# Patient Record
Sex: FEMALE | Race: WHITE | NOT HISPANIC OR LATINO | URBAN - METROPOLITAN AREA
[De-identification: names, ages, dates, MRNs, and addresses within clinical notes are randomized per-mention and may not be internally consistent; named-entity substitution may affect disease eponyms.]

---

## 2024-12-17 ENCOUNTER — OFFICE VISIT (OUTPATIENT)
Dept: URGENT CARE | Age: 39
End: 2024-12-17
Payer: MEDICAID

## 2024-12-17 VITALS
BODY MASS INDEX: 29.62 KG/M2 | DIASTOLIC BLOOD PRESSURE: 72 MMHG | RESPIRATION RATE: 16 BRPM | WEIGHT: 200 LBS | TEMPERATURE: 97.4 F | OXYGEN SATURATION: 98 % | HEIGHT: 69 IN | HEART RATE: 102 BPM | SYSTOLIC BLOOD PRESSURE: 107 MMHG

## 2024-12-17 DIAGNOSIS — J06.9 VIRAL URI: Primary | ICD-10-CM

## 2024-12-17 DIAGNOSIS — Z20.822 CONTACT WITH AND (SUSPECTED) EXPOSURE TO COVID-19: ICD-10-CM

## 2024-12-17 DIAGNOSIS — R68.89 FLU-LIKE SYMPTOMS: ICD-10-CM

## 2024-12-17 DIAGNOSIS — J02.9 SORE THROAT: ICD-10-CM

## 2024-12-17 LAB
POC RAPID INFLUENZA A: NEGATIVE
POC RAPID INFLUENZA B: NEGATIVE
POC RAPID STREP: NEGATIVE
POC SARS-COV-2 AG BINAX: NORMAL

## 2024-12-17 PROCEDURE — 87804 INFLUENZA ASSAY W/OPTIC: CPT | Performed by: NURSE PRACTITIONER

## 2024-12-17 PROCEDURE — 87811 SARS-COV-2 COVID19 W/OPTIC: CPT | Performed by: NURSE PRACTITIONER

## 2024-12-17 PROCEDURE — 3008F BODY MASS INDEX DOCD: CPT | Performed by: NURSE PRACTITIONER

## 2024-12-17 PROCEDURE — 99204 OFFICE O/P NEW MOD 45 MIN: CPT | Performed by: NURSE PRACTITIONER

## 2024-12-17 PROCEDURE — 1036F TOBACCO NON-USER: CPT | Performed by: NURSE PRACTITIONER

## 2024-12-17 PROCEDURE — 87880 STREP A ASSAY W/OPTIC: CPT | Performed by: NURSE PRACTITIONER

## 2024-12-17 RX ORDER — PANTOPRAZOLE SODIUM 40 MG/1
1 TABLET, DELAYED RELEASE ORAL
COMMUNITY
Start: 2024-05-26

## 2024-12-17 RX ORDER — LANOLIN ALCOHOL/MO/W.PET/CERES
1 CREAM (GRAM) TOPICAL DAILY
COMMUNITY

## 2024-12-17 RX ORDER — CLOPIDOGREL BISULFATE 75 MG/1
75 TABLET ORAL
COMMUNITY
Start: 2024-12-16

## 2024-12-17 RX ORDER — BROMPHENIRAMINE MALEATE, PSEUDOEPHEDRINE HYDROCHLORIDE, AND DEXTROMETHORPHAN HYDROBROMIDE 2; 30; 10 MG/5ML; MG/5ML; MG/5ML
SYRUP ORAL
Qty: 200 ML | Refills: 0 | Status: SHIPPED | OUTPATIENT
Start: 2024-12-17

## 2024-12-17 RX ORDER — PREDNISONE 20 MG/1
20 TABLET ORAL DAILY
Qty: 5 TABLET | Refills: 0 | Status: SHIPPED | OUTPATIENT
Start: 2024-12-17 | End: 2024-12-22

## 2024-12-17 RX ORDER — ASPIRIN 81 MG/1
81 TABLET ORAL DAILY
COMMUNITY

## 2024-12-17 RX ORDER — MELOXICAM 15 MG/1
15 TABLET ORAL DAILY
COMMUNITY

## 2024-12-17 RX ORDER — TRAMADOL HYDROCHLORIDE 50 MG/1
TABLET ORAL
COMMUNITY

## 2024-12-17 RX ORDER — LEVOTHYROXINE SODIUM 175 UG/1
TABLET ORAL
COMMUNITY
Start: 2024-12-08

## 2024-12-17 RX ORDER — EPINEPHRINE 0.22MG
200 AEROSOL WITH ADAPTER (ML) INHALATION DAILY
COMMUNITY

## 2024-12-17 RX ORDER — GABAPENTIN 300 MG/1
300 CAPSULE ORAL 3 TIMES DAILY
COMMUNITY

## 2024-12-17 ASSESSMENT — ENCOUNTER SYMPTOMS
PSYCHIATRIC NEGATIVE: 1
RESPIRATORY NEGATIVE: 1
ENDOCRINE NEGATIVE: 1
SORE THROAT: 1
MUSCULOSKELETAL NEGATIVE: 1
NEUROLOGICAL NEGATIVE: 1
FEVER: 1
CHILLS: 1
HEMATOLOGIC/LYMPHATIC NEGATIVE: 1
GASTROINTESTINAL NEGATIVE: 1
ALLERGIC/IMMUNOLOGIC NEGATIVE: 1
CARDIOVASCULAR NEGATIVE: 1
EYES NEGATIVE: 1
RHINORRHEA: 1

## 2024-12-18 NOTE — PROGRESS NOTES
"Subjective   Patient ID: Elvira Angel is a 38 y.o. female. They present today with a chief complaint of Fever, Earache, Chills, and Sore Throat.    History of Present Illness  Patient is a 39 y/o female c/o nasal congestion/drainage, fever, ear pain, bodyaches, fever, chills, sore throat x3days. Patient denies symptoms of lethargy, weakness, chest pain/tightness/pressure, SOB, wheezing, N/V/D, ABD pain. No OTC medication reported to be taken.       Fever   Associated symptoms include congestion, ear pain and a sore throat.   Earache   Associated symptoms include rhinorrhea and a sore throat.   Sore Throat   Associated symptoms include congestion and ear pain.       Past Medical History  Allergies as of 12/17/2024 - Reviewed 12/17/2024   Allergen Reaction Noted    Amoxicillin Nausea/vomiting 12/19/2011    Atorvastatin Myalgia 01/19/2024    Latex, natural rubber Rash 12/19/2011       (Not in a hospital admission)       History reviewed. No pertinent past medical history.    History reviewed. No pertinent surgical history.     reports that she has never smoked. She has never used smokeless tobacco. She reports that she does not drink alcohol and does not use drugs.    Review of Systems  Review of Systems   Constitutional:  Positive for chills and fever.        Bodyaches   HENT:  Positive for congestion, ear pain, rhinorrhea and sore throat.    Eyes: Negative.    Respiratory: Negative.     Cardiovascular: Negative.    Gastrointestinal: Negative.    Endocrine: Negative.    Genitourinary: Negative.    Musculoskeletal: Negative.    Skin: Negative.    Allergic/Immunologic: Negative.    Neurological: Negative.    Hematological: Negative.    Psychiatric/Behavioral: Negative.                                    Objective    Vitals:    12/17/24 1926   BP: 107/72   Pulse: 102   Resp: 16   Temp: 36.3 °C (97.4 °F)   SpO2: 98%   Weight: 90.7 kg (200 lb)   Height: 1.753 m (5' 9\")     No LMP recorded.    Physical " Exam  Constitutional:       Comments: Patient A/O x4, LOC 5, calm and cooperative. Patient self-ambulatory to treatment area and is in no acute distress.    HENT:      Head: Normocephalic and atraumatic.      Ears:      Comments: Bilateral TMs pearly grey with moderate amounts of serous fluid behind TMs; nonbulging and nonperforated TMs bilaterally. No edema, erythema, exudates, cerumen to canals bilaterally.      Nose:      Comments: No edema, erythema to bilateral inferior turbinates. Trace amounts of serous drainage from nares. No frontal or maxillary sinus pressure to palpation      Mouth/Throat:      Comments: Posterior pharynx erythematous without tonsillar enlargement or exudates. Uvula midline. No petechiae to palates. Tonsil stone to left side.   Eyes:      Extraocular Movements: Extraocular movements intact.      Conjunctiva/sclera: Conjunctivae normal.      Pupils: Pupils are equal, round, and reactive to light.   Cardiovascular:      Rate and Rhythm: Normal rate and regular rhythm.      Pulses: Normal pulses.      Heart sounds: Normal heart sounds.   Pulmonary:      Comments: No audible cough during physical examination. All lungfields clear to roomair per auscultation. Patient speaking in complete sentences without SOB or difficulty. Patient in no acute respiratory distress   Abdominal:      General: Abdomen is flat.      Palpations: Abdomen is soft.   Musculoskeletal:         General: Normal range of motion.      Cervical back: Neck supple.   Skin:     General: Skin is warm and dry.      Capillary Refill: Capillary refill takes less than 2 seconds.   Neurological:      General: No focal deficit present.      Mental Status: She is oriented to person, place, and time.   Psychiatric:         Mood and Affect: Mood normal.         Behavior: Behavior normal.         Procedures    Point of Care Test & Imaging Results from this visit  Results for orders placed or performed in visit on 12/17/24   POCT rapid  strep A manually resulted   Result Value Ref Range    POC Rapid Strep Negative Negative   POCT Covid-19 Rapid Antigen   Result Value Ref Range    POC DEANNA-COV-2 AG  Presumptive negative test for SARS-CoV-2 (no antigen detected)     Presumptive negative test for SARS-CoV-2 (no antigen detected)   POCT Influenza A/B manually resulted   Result Value Ref Range    POC Rapid Influenza A Negative Negative    POC Rapid Influenza B Negative Negative      No results found.    Diagnostic study results (if any) were reviewed by HARJINDER Brice.    Assessment/Plan   Allergies, medications, history, and pertinent labs/EKGs/Imaging reviewed by HARJINDER Brice.     Medical Decision Making  Symptoms likely viral in nature at this time. Will send Rx of Prednisone and Bromfed for symptom relief. OTC Motrin/Tylenol as needed.     At time of discharge, patient was clinically well-appearing and appropriate for outpatient management. The patient/parent/guardian was educated regarding diagnosis, supportive care, OTC and Rx medications. The patient/parent/guardian was given the opportunity to ask questions prior to discharge. They verbalized understanding of discussion of treatment plan, expected course of illness and/or injury, indications on when to return to , when to seek further evaluation in ED/call 911, and the need to follow up with PCP and/or specialist as referred. Patient/parent/guardian was provided with work/school documentation if requested. Patient stable upon discharge.     Orders and Diagnoses  Diagnoses and all orders for this visit:  Viral URI  -     predniSONE (Deltasone) 20 mg tablet; Take 1 tablet (20 mg) by mouth once daily for 5 days. Take in the morning with food  -     brompheniramine-pseudoeph-DM 2-30-10 mg/5 mL syrup; Take 5-10mL PO every 6-8 hours as needed for cough. May cause drowsiness  Sore throat  -     POCT rapid strep A manually resulted  Contact with and (suspected) exposure to covid-19  -      POCT Covid-19 Rapid Antigen  Flu-like symptoms  -     POCT Influenza A/B manually resulted      Medical Admin Record      Patient disposition: Home    Electronically signed by HARJINDER Brice  7:49 PM

## 2025-01-27 ENCOUNTER — APPOINTMENT (OUTPATIENT)
Dept: SURGERY | Facility: CLINIC | Age: 40
End: 2025-01-27

## 2025-01-27 VITALS
OXYGEN SATURATION: 99 % | SYSTOLIC BLOOD PRESSURE: 110 MMHG | WEIGHT: 200 LBS | TEMPERATURE: 97.3 F | DIASTOLIC BLOOD PRESSURE: 78 MMHG | BODY MASS INDEX: 29.53 KG/M2 | HEART RATE: 89 BPM

## 2025-01-27 DIAGNOSIS — K60.2 ANAL FISSURE: Primary | ICD-10-CM

## 2025-01-27 PROCEDURE — 99203 OFFICE O/P NEW LOW 30 MIN: CPT | Performed by: SURGERY

## 2025-01-27 NOTE — PROGRESS NOTES
Subjective   Patient ID: Elvira Angel is a 39 y.o. female who presents for New Patient Visit (hemorrhoids).  She had a severe rectal pain with every bowel movement.  The patient previously was diagnosed with anal fissure.  Treated with nitroglycerin ointment without success.  Referred to my attention for further treatment and recommendations.  Patient has severe pain, up to the point when she is crying during the office visit    HPI as described above.  The patient had a history of cardiac surgery with PFO closure, currently patient is on the Plavix.  Review of Systems GI consistent with severe rectal pain.  Review of all other 14 system is negative  Physical Exam pulse equal bilaterally, mucosa moist, bilateral breath sounds, noted to auscultation, regular rate, no murmurs, Karla soft, nontender, palpable peripheral pulse, no focal neurological motor deficits.  Rectal exam was done in the presence of the female chaperone.  There are enlarged external hemorrhoids without evidence of thrombosis.  Extremely painful digital exam, which precludes adequate assessment.    Objective I reviewed all available data including lab results, radiological studies, previous reports and notes.    No diagnosis found.   There is no problem list on file for this patient.     Allergies   Allergen Reactions    Amoxicillin Nausea/vomiting    Atorvastatin Myalgia    Latex, Natural Rubber Rash      Medication Documentation Review Audit       Reviewed by Benny Chinchilla MD (Physician) on 01/27/25 at 1042      Medication Order Taking? Sig Documenting Provider Last Dose Status   aspirin 81 mg EC tablet [id number]  Take 81 mg by mouth once daily. Historical Provider, MD  Active   brompheniramine-pseudoeph-DM 2-30-10 mg/5 mL syrup [id number]  Take 5-10mL PO every 6-8 hours as needed for cough. May cause drowsiness Tia Mandela, APRN-CNP  Active   clopidogrel (Plavix) 75 mg tablet [id number]  Take 75 mg by mouth once daily. Historical Provider,  MD  Active   coenzyme Q-10 100 mg capsule [id number]  Take 200 mg by mouth once daily. Historical Provider, MD  Active   gabapentin (Neurontin) 300 mg capsule [id number]  Take 300 mg by mouth 3 times a day. Historical Provider, MD  Active   magnesium oxide (Mag-Ox) 400 mg (241.3 mg magnesium) tablet [id number]  Take 1 tablet by mouth once daily. Historical Provider, MD  Active   meloxicam (Mobic) 15 mg tablet [id number]  Take 15 mg by mouth once daily. Historical Provider, MD  Active   pantoprazole (ProtoNix) 40 mg EC tablet [id number]  Take 1 tablet by mouth every 12 hours. Historical Provider, MD  Active   Synthroid 175 mcg tablet [id number]   Historical Provider, MD  Active   traMADol (Ultram) 50 mg tablet [id number]  TAKE 1 TO 2 TABLETS BY MOUTH EVERY DAY AS NEEDED Historical Provider, MD  Active                    History reviewed. No pertinent past medical history.  Social History     Tobacco Use   Smoking Status Never   Smokeless Tobacco Current   Tobacco Comments    vape     No family history on file.   History reviewed. No pertinent surgical history.    Assessment/Plan   With severe rectal pain, previously diagnosed with a fissure.  Failure of nonoperative management.  The patient has indication for exam anesthesia, possible rectal dilation to promote fissure healing, possible fissurectomy.  Currently patient is on the Plavix secondary to cardiac surgery, therefore patient will continue to take Plavix with understanding that it may change plan of the fissurectomy.  Risks, benefits, alternative treatment were explained to the patient.  All questions were answered.  Informed consent was obtained.      Benny Chinchilla MD

## 2025-01-31 LAB — NON-UH HIE POC GLUCOSE: 96 MG/DL (ref 72–100)

## 2025-02-05 DIAGNOSIS — K60.2 ANAL FISSURE: Primary | ICD-10-CM

## 2025-02-06 RX ORDER — KETOROLAC TROMETHAMINE 10 MG/1
10 TABLET, FILM COATED ORAL EVERY 6 HOURS PRN
Qty: 20 TABLET | Refills: 0 | Status: SHIPPED | OUTPATIENT
Start: 2025-02-06

## 2025-02-19 ENCOUNTER — APPOINTMENT (OUTPATIENT)
Dept: SURGERY | Facility: CLINIC | Age: 40
End: 2025-02-19
Payer: MEDICAID

## 2025-02-19 DIAGNOSIS — K60.2 ANAL FISSURE: Primary | ICD-10-CM

## 2025-02-19 PROCEDURE — 99024 POSTOP FOLLOW-UP VISIT: CPT | Performed by: SURGERY

## 2025-02-19 ASSESSMENT — PAIN SCALES - GENERAL: PAINLEVEL_OUTOF10: 8

## 2025-02-19 NOTE — PROGRESS NOTES
Subjective   Patient ID: Elvira Angel is a 39 y.o. female who presents for postoperative visit after fissurectomy    HPI of anal fissure  Review of Systems  Physical Exam  There is no evidence of infection.  Persistent external hemorrhoids without complications.  Area of fissurectomy is healing by the primary intention.  Vicryl sutures are still in the place  Objective     No diagnosis found.   There is no problem list on file for this patient.     Allergies   Allergen Reactions    Amoxicillin Nausea/vomiting    Atorvastatin Myalgia    Latex, Natural Rubber Rash      Medication Documentation Review Audit       Reviewed by Benny Chinchilla MD (Physician) on 02/19/25 at 1325      Medication Order Taking? Sig Documenting Provider Last Dose Status   aspirin 81 mg EC tablet [id number] Yes Take 1 tablet (81 mg) by mouth once daily. Historical Provider, MD  Active   brompheniramine-pseudoeph-DM 2-30-10 mg/5 mL syrup [id number] Yes Take 5-10mL PO every 6-8 hours as needed for cough. May cause drowsiness Tia Mandela, APRN-CNP  Active   clopidogrel (Plavix) 75 mg tablet [id number] Yes Take 1 tablet (75 mg) by mouth once daily. Historical Provider, MD  Active   coenzyme Q-10 100 mg capsule [id number] Yes Take 2 capsules (200 mg) by mouth once daily. Historical Provider, MD  Active   gabapentin (Neurontin) 300 mg capsule [id number] Yes Take 1 capsule (300 mg) by mouth 3 times a day. Historical Provider, MD  Active   ketorolac (Toradol) 10 mg tablet [id number] Yes Take 1 tablet (10 mg) by mouth every 6 hours if needed for moderate pain (4 - 6) for up to 30 doses. Benny Chinchilla MD  Active   magnesium oxide (Mag-Ox) 400 mg (241.3 mg magnesium) tablet [id number] Yes Take 1 tablet (400 mg) by mouth once daily. Historical Provider, MD  Active   meloxicam (Mobic) 15 mg tablet [id number] Yes Take 1 tablet (15 mg) by mouth once daily. Historical Provider, MD  Active   pantoprazole (ProtoNix) 40 mg EC tablet [id number] Yes Take 1 tablet  (40 mg) by mouth every 12 hours. Historical Provider, MD  Active   Synthroid 175 mcg tablet [id number] Yes  Historical Provider, MD  Active   traMADol (Ultram) 50 mg tablet [id number] Yes TAKE 1 TO 2 TABLETS BY MOUTH EVERY DAY AS NEEDED Historical Provider, MD  Active                    History reviewed. No pertinent past medical history.  Social History     Tobacco Use   Smoking Status Never   Smokeless Tobacco Current   Tobacco Comments    vape     No family history on file.   History reviewed. No pertinent surgical history.    Assessment/Plan   That was post fissurectomy.  Gradual improvement.  No further treatment is indicated.  Recommended to continue use the nitroglycerin ointment.  Follow-up as needed      Benny Chinchilla MD

## 2025-03-06 DIAGNOSIS — K60.2 ANAL FISSURE: Primary | ICD-10-CM

## 2025-03-06 NOTE — TELEPHONE ENCOUNTER
Pt had a procedue 3/05/25 x   Fussurectomy.  Pt requested a Rx for stool softener. She does not prefer getting over the counter, if she has insurance.

## 2025-03-07 RX ORDER — ASPIRIN 81 MG
100 TABLET, DELAYED RELEASE (ENTERIC COATED) ORAL 2 TIMES DAILY
Qty: 10 TABLET | Refills: 0 | Status: SHIPPED | OUTPATIENT
Start: 2025-03-07 | End: 2025-03-17

## 2025-03-12 ENCOUNTER — APPOINTMENT (OUTPATIENT)
Dept: SURGERY | Facility: CLINIC | Age: 40
End: 2025-03-12
Payer: MEDICAID

## 2025-03-12 DIAGNOSIS — K64.8 HEMORRHOIDS, COMPLICATED: Primary | ICD-10-CM

## 2025-03-12 PROCEDURE — 46600 DIAGNOSTIC ANOSCOPY SPX: CPT | Performed by: SURGERY

## 2025-03-12 PROCEDURE — 99214 OFFICE O/P EST MOD 30 MIN: CPT | Performed by: SURGERY

## 2025-03-12 ASSESSMENT — PAIN SCALES - GENERAL: PAINLEVEL_OUTOF10: 6

## 2025-03-12 NOTE — PROGRESS NOTES
Subjective   Patient ID: Elvira Angel is a 39 y.o. female who presents for Hemorrhoids.  Patient had a previous history of fissurectomy.  Patient was doing very well, patient develop inflammatory hemorrhoid, was seen in the emergency department was treated nonoperatively.  Referred to my attention for reassessment.    HPI history of anal fissure, fissurectomy.  Subsequent development of thrombosed hemorrhoid, which improved with nonoperative management  Review of Systems GI consistent with rectal pain.  Review of all other 10 system is negative  Physical Exam  Pupils equal bilaterally, mucosa moist, bilateral breath sounds, regular rate, abdomen soft, nontender, palpable peripheral pulse, no focal neurological motor deficits.    Rectal exam was done in the presence of female chaperonePatient ID: Elvira Angel is a 39 y.o. female.    Anoscopy    Date/Time: 3/12/2025 3:32 PM    Performed by: Benny Chinchilla MD  Authorized by: Benny Chinchilla MD    Consent:     Consent obtained:  Verbal    Consent given by:  Patient    Risks, benefits, and alternatives were discussed: yes    Anoscopy showed inflamed hemorrhoid posteriorly and to the left.  Patient has a multiple external hemorrhoids, nonreducible, multiple skin tags.  Significant improvement of the fissure with near complete healing  Objective I reviewed all available data including lab results, radiological studies, previous reports and notes.  The scan from February showed no evidence of pathology, no perirectal abscess    No diagnosis found.   There is no problem list on file for this patient.     Allergies   Allergen Reactions    Amoxicillin Nausea/vomiting    Atorvastatin Myalgia    Latex, Natural Rubber Rash      Medication Documentation Review Audit       Reviewed by Priya Pace CMA (Medical Assistant) on 03/12/25 at 1503      Medication Order Taking? Sig Documenting Provider Last Dose Status   aspirin 81 mg EC tablet [id number] Yes Take 1  tablet (81 mg) by mouth once daily. Historical Provider, MD  Active   brompheniramine-pseudoeph-DM 2-30-10 mg/5 mL syrup [id number] Yes Take 5-10mL PO every 6-8 hours as needed for cough. May cause drowsiness Tia Mandela, APRN-CNP  Active   clopidogrel (Plavix) 75 mg tablet [id number] Yes Take 1 tablet (75 mg) by mouth once daily. Historical Provider, MD  Active   coenzyme Q-10 100 mg capsule [id number] Yes Take 2 capsules (200 mg) by mouth once daily. Historical Provider, MD  Active   docusate sodium (Colace) 100 mg tablet [id number] Yes Take 1 tablet (100 mg) by mouth 2 times a day for 10 days. Benny Chinchilla MD  Active   gabapentin (Neurontin) 300 mg capsule [id number] Yes Take 1 capsule (300 mg) by mouth 3 times a day. Historical Provider, MD  Active   ketorolac (Toradol) 10 mg tablet [id number] Yes Take 1 tablet (10 mg) by mouth every 6 hours if needed for moderate pain (4 - 6) for up to 30 doses. Benny Chinchilla MD  Active   magnesium oxide (Mag-Ox) 400 mg (241.3 mg magnesium) tablet [id number] Yes Take 1 tablet (400 mg) by mouth once daily. Historical Provider, MD  Active   meloxicam (Mobic) 15 mg tablet [id number] Yes Take 1 tablet (15 mg) by mouth once daily. Historical Provider, MD  Active   pantoprazole (ProtoNix) 40 mg EC tablet [id number] Yes Take 1 tablet (40 mg) by mouth every 12 hours. Historical Provider, MD  Active   Synthroid 175 mcg tablet [id number] Yes  Historical Provider, MD  Active   traMADol (Ultram) 50 mg tablet [id number] Yes TAKE 1 TO 2 TABLETS BY MOUTH EVERY DAY AS NEEDED Historical Provider, MD  Active                    No past medical history on file.  Social History     Tobacco Use   Smoking Status Never   Smokeless Tobacco Current   Tobacco Comments    vape     No family history on file.   No past surgical history on file.    Assessment/Plan     With a history of anal fissure.  Significant improvement.  Development of thrombosed hemorrhoid, treated nonoperatively.  Patient has a multiple  external hemorrhoids.  Patient would like to proceed with a complete excision of the hemorrhoids.  Currently patient is still anticoagulated, therefore she will schedule appointment after consultation with her cardiologist and will proceed with a staged external hemorrhoidectomy, which would require multiple surgical intervention after cleared by the cardiology and patient is of blood thinners.    Benny Chinchilla MD

## 2025-03-27 ENCOUNTER — TELEPHONE (OUTPATIENT)
Dept: SURGERY | Facility: CLINIC | Age: 40
End: 2025-03-27
Payer: MEDICAID

## 2025-03-27 NOTE — TELEPHONE ENCOUNTER
Pt c/o of hemorrhoids bleeding and pain. Pt requested for a Rx to stop the pain and bleeding.  Pt was provided with Dr Serra's phone number.

## 2025-04-23 ENCOUNTER — OFFICE VISIT (OUTPATIENT)
Dept: SURGERY | Facility: CLINIC | Age: 40
End: 2025-04-23
Payer: MEDICAID

## 2025-04-23 DIAGNOSIS — K60.2 ANAL FISSURE: Primary | ICD-10-CM

## 2025-04-23 PROCEDURE — 46505 CHEMODENERVATION ANAL MUSC: CPT | Performed by: SURGERY

## 2025-04-23 PROCEDURE — 99213 OFFICE O/P EST LOW 20 MIN: CPT | Performed by: SURGERY

## 2025-04-23 PROCEDURE — 45990 SURG DX EXAM ANORECTAL: CPT | Performed by: SURGERY

## 2025-05-09 ENCOUNTER — TELEPHONE (OUTPATIENT)
Dept: SURGERY | Facility: CLINIC | Age: 40
End: 2025-05-09
Payer: MEDICAID

## 2025-05-09 NOTE — TELEPHONE ENCOUNTER
Pt LVM message requesting another Rx for Percocet. 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  Provider to advise.

## 2025-05-12 ENCOUNTER — APPOINTMENT (OUTPATIENT)
Dept: SURGERY | Facility: CLINIC | Age: 40
End: 2025-05-12
Payer: MEDICAID

## 2025-05-12 DIAGNOSIS — K60.2 ANAL FISSURE: Primary | ICD-10-CM

## 2025-05-12 PROCEDURE — 99213 OFFICE O/P EST LOW 20 MIN: CPT | Performed by: SURGERY

## 2025-05-12 ASSESSMENT — PAIN SCALES - GENERAL: PAINLEVEL_OUTOF10: 8

## 2025-05-12 NOTE — PROGRESS NOTES
Subjective   Patient ID: Elvira Angel is a 39 y.o. female who presents for assessment after Botox injection, manual dilation of the sphincter    HPI history of chronic anal fissure  Review of Systems consistent with rectal pain  Physical Exam Pupils equal bilaterally, oral mucosa moist, bilateral breath sounds, clear to auscultation, regular rhythm and rate, no murmurs, abdomen is soft, nontender, nondistended, no palpable hernias, palpable peripheral pulse, no focal neurological motor deficits.  ENT exam within normal limits.  Musculoskeletal exam within normal limits.      Rectal exam was done in the presence of female chaperone.  There is no significant pain in the palpation perianal area.  The fissure in the process of the healing.  Multiple external skin tags without complications.    Objective     No diagnosis found.   Problem List[1]   RX Allergies[2]   Medication Documentation Review Audit       Reviewed by Priya Pace CMA (Medical Assistant) on 05/12/25 at 1546      Medication Order Taking? Sig Documenting Provider Last Dose Status   aspirin 81 mg EC tablet [id number] Yes Take 1 tablet (81 mg) by mouth once daily. Historical Provider, MD  Active   brompheniramine-pseudoeph-DM 2-30-10 mg/5 mL syrup [id number] Yes Take 5-10mL PO every 6-8 hours as needed for cough. May cause drowsiness Tia LAUREN Bautista-CNP  Active   clopidogrel (Plavix) 75 mg tablet [id number] Yes Take 1 tablet (75 mg) by mouth once daily. Historical Provider, MD  Active   coenzyme Q-10 100 mg capsule [id number] Yes Take 2 capsules (200 mg) by mouth once daily. Historical Provider, MD  Active   gabapentin (Neurontin) 300 mg capsule [id number] Yes Take 1 capsule (300 mg) by mouth 3 times a day. Historical Provider, MD  Active   ketorolac (Toradol) 10 mg tablet [id number] Yes Take 1 tablet (10 mg) by mouth every 6 hours if needed for moderate pain (4 - 6) for up to 30 doses. Benny Chinchilla MD  Active   magnesium oxide (Mag-Ox) 400  mg (241.3 mg magnesium) tablet [id number] Yes Take 1 tablet (400 mg) by mouth once daily. Historical Provider, MD  Active   meloxicam (Mobic) 15 mg tablet [id number] Yes Take 1 tablet (15 mg) by mouth once daily. Historical Provider, MD  Active   pantoprazole (ProtoNix) 40 mg EC tablet [id number] Yes Take 1 tablet (40 mg) by mouth every 12 hours. Historical Provider, MD  Active   Synthroid 175 mcg tablet [id number] Yes  Historical Provider, MD  Active   traMADol (Ultram) 50 mg tablet [id number] Yes TAKE 1 TO 2 TABLETS BY MOUTH EVERY DAY AS NEEDED Historical Provider, MD  Active                    Medical History[3]  Tobacco Use History[4]  Family History[5]   Surgical History[6]    Assessment/Plan     Patient with a chronic rectal fissure.  There is some improvement.  No evidence of complications.  Recommended to continue stool softeners, fiber supplements, follow-up as needed    Benny Chinchilla MD          [1] There is no problem list on file for this patient.  [2]   Allergies  Allergen Reactions    Amoxicillin Nausea/vomiting    Atorvastatin Myalgia    Latex, Natural Rubber Rash   [3] No past medical history on file.  [4]   Social History  Tobacco Use   Smoking Status Never   Smokeless Tobacco Current   Tobacco Comments    vape   [5] No family history on file.  [6] No past surgical history on file.

## 2025-07-24 ENCOUNTER — OFFICE VISIT (OUTPATIENT)
Dept: SURGERY | Facility: CLINIC | Age: 40
End: 2025-07-24
Payer: MEDICAID

## 2025-07-24 DIAGNOSIS — K60.2 ANAL FISSURE: Primary | ICD-10-CM

## 2025-07-24 PROCEDURE — 99213 OFFICE O/P EST LOW 20 MIN: CPT | Performed by: SURGERY

## 2025-07-24 PROCEDURE — 46600 DIAGNOSTIC ANOSCOPY SPX: CPT | Performed by: SURGERY

## 2025-07-24 ASSESSMENT — PAIN SCALES - GENERAL: PAINLEVEL_OUTOF10: 8

## 2025-07-24 NOTE — PROGRESS NOTES
Subjective   Patient ID: Elvira Angel is a 39 y.o. female who presents for Rectal Pain (Anal fissure, c/o sharp pain x two weeks on and off, last few days it's constant).    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  There was significant improvement.  Patient was doing very well.  Patient develop above-mentioned symptoms.  Patient is an 8 years in the office because of severity of rectal pain.  Review of Systems GI consistent with rectal pain with the bowel movements.  Review of all other 10 system is negative  Physical Exam Pupils equal bilaterally, oral mucosa moist, bilateral breath sounds, clear to auscultation, regular rhythm and rate, no murmurs, abdomen is soft, nontender, nondistended, no palpable hernias, palpable peripheral pulse, no focal neurological motor deficits.  ENT exam within normal limits.  Musculoskeletal exam within normal limits.  Patient ID: Elvira Angel is a 39 y.o. female.    Anoscopy    Date/Time: 7/24/2025 3:30 PM    Performed by: Benny Chinchilla MD  Authorized by: Benny Chinchilla MD    Consent:     Consent obtained:  Verbal    Consent given by:  Patient    Risks, benefits, and alternatives were discussed: yes    Seizure was done in the presence of female chaperone.  Patient had the external anal skin tags.  No evidence of inflamed hemorrhoids.  Patient has extremely painful digital exam, as well as severe tenderness posterior and anteriorly.  I cannot do adequate exam because of severity of pain, but there was a visible fissure.    Objective I reviewed all available data including lab results, radiological studies, previous reports and notes.    No diagnosis found.   Problem List[1]   RX Allergies[2]   Medication Documentation Review Audit       Reviewed by Benny Chinchilla MD (Physician) on 07/24/25 at 1529      Medication Order Taking? Sig Documenting Provider Last Dose Status   aspirin 81 mg EC tablet [id number] Yes Take 1 tablet (81 mg) by mouth once daily. Historical  Provider, MD  Active   brompheniramine-pseudoeph-DM 2-30-10 mg/5 mL syrup [id number] Yes Take 5-10mL PO every 6-8 hours as needed for cough. May cause drowsiness Tia Mandela, APRN-CNP  Active   clopidogrel (Plavix) 75 mg tablet [id number] Yes Take 1 tablet (75 mg) by mouth once daily. Historical Provider, MD  Active   coenzyme Q-10 100 mg capsule [id number] Yes Take 2 capsules (200 mg) by mouth once daily. Historical Provider, MD  Active   gabapentin (Neurontin) 300 mg capsule [id number] Yes Take 1 capsule (300 mg) by mouth 3 times a day. Historical Provider, MD  Active   ketorolac (Toradol) 10 mg tablet [id number] Yes Take 1 tablet (10 mg) by mouth every 6 hours if needed for moderate pain (4 - 6) for up to 30 doses. Benny Chinchilla MD  Active   magnesium oxide (Mag-Ox) 400 mg (241.3 mg magnesium) tablet [id number] Yes Take 1 tablet by mouth once daily. Historical Provider, MD  Active   meloxicam (Mobic) 15 mg tablet [id number] Yes Take 1 tablet (15 mg) by mouth once daily. Historical Provider, MD  Active   pantoprazole (ProtoNix) 40 mg EC tablet [id number] Yes Take 1 tablet (40 mg) by mouth every 12 hours. Historical Provider, MD  Active   Synthroid 175 mcg tablet [id number] Yes  Historical Provider, MD  Active   traMADol (Ultram) 50 mg tablet [id number] Yes TAKE 1 TO 2 TABLETS BY MOUTH EVERY DAY AS NEEDED Historical Provider, MD  Active                    Medical History[3]  Tobacco Use History[4]  Family History[5]   Surgical History[6]    Assessment/Plan   The patient with complicated past medical history, recurrence of the fissure.  We will prescribe nitroglycerin ointment for treatment of the fissure.  10% lidocaine was meant for local pain control.  Also prescription of Percocet 5/325, 20 pills for extra pain control was provided.  Follow-up in the office in 3 to 4 weeks.  Recommended continue stool softeners, fiber supplements, sitz bath.      Benny Chinchilla MD          [1] There is no problem list on file for  this patient.  [2]   Allergies  Allergen Reactions    Amoxicillin Nausea/vomiting    Atorvastatin Myalgia    Latex, Natural Rubber Rash   [3] History reviewed. No pertinent past medical history.  [4]   Social History  Tobacco Use   Smoking Status Never   Smokeless Tobacco Current   Tobacco Comments    vape   [5] No family history on file.  [6] History reviewed. No pertinent surgical history.

## 2025-07-28 ENCOUNTER — APPOINTMENT (OUTPATIENT)
Dept: SURGERY | Facility: CLINIC | Age: 40
End: 2025-07-28
Payer: MEDICAID

## 2025-08-21 ENCOUNTER — APPOINTMENT (OUTPATIENT)
Dept: SURGERY | Facility: CLINIC | Age: 40
End: 2025-08-21
Payer: MEDICAID

## 2025-08-21 DIAGNOSIS — K60.2 ANAL FISSURE: Primary | ICD-10-CM

## 2025-08-21 PROCEDURE — 99213 OFFICE O/P EST LOW 20 MIN: CPT | Performed by: SURGERY

## 2025-08-21 ASSESSMENT — PAIN SCALES - GENERAL: PAINLEVEL_OUTOF10: 6

## 2025-08-25 ENCOUNTER — APPOINTMENT (OUTPATIENT)
Dept: RADIOLOGY | Facility: CLINIC | Age: 40
End: 2025-08-25
Payer: MEDICAID

## 2025-09-23 ENCOUNTER — APPOINTMENT (OUTPATIENT)
Dept: SURGERY | Facility: CLINIC | Age: 40
End: 2025-09-23
Payer: MEDICAID